# Patient Record
Sex: FEMALE
[De-identification: names, ages, dates, MRNs, and addresses within clinical notes are randomized per-mention and may not be internally consistent; named-entity substitution may affect disease eponyms.]

---

## 2020-02-04 VITALS — SYSTOLIC BLOOD PRESSURE: 105 MMHG | DIASTOLIC BLOOD PRESSURE: 57 MMHG

## 2020-02-04 LAB
BASOPHILS NFR BLD AUTO: 0.5 % (ref 0–5)
EOSINOPHIL NFR BLD AUTO: 0.8 % (ref 0–8)
ERYTHROCYTE [DISTWIDTH] IN BLOOD BY AUTOMATED COUNT: 15.8 % (ref 11–15.5)
HCT VFR BLD AUTO: 35.6 % (ref 36–48)
LYMPHOCYTES NFR SPEC AUTO: 23.3 % (ref 21–51)
MCH RBC QN AUTO: 25.2 PG (ref 27–33)
MCHC RBC AUTO-ENTMCNC: 31.2 G/DL (ref 32–36)
MCV RBC AUTO: 80.7 FL (ref 79–99)
MONOCYTES NFR BLD AUTO: 7.3 % (ref 3–13)
NEUTROPHILS NFR BLD AUTO: 67.8 % (ref 40–77)
NRBC BLD MANUAL-RTO: 0 % (ref 0–0.19)
PLATELET # BLD AUTO: 294 K/UL (ref 130–400)
RBC # BLD AUTO: 4.41 MIL/UL (ref 4–5.5)
WBC # BLD AUTO: 9.6 K/UL (ref 4.8–10.8)

## 2020-02-05 ENCOUNTER — HOSPITAL ENCOUNTER (OUTPATIENT)
Dept: HOSPITAL 90 - DAH | Age: 25
Discharge: HOME | End: 2020-02-05
Attending: SPECIALIST
Payer: MEDICAID

## 2020-02-05 VITALS — DIASTOLIC BLOOD PRESSURE: 76 MMHG | SYSTOLIC BLOOD PRESSURE: 116 MMHG

## 2020-02-05 VITALS — SYSTOLIC BLOOD PRESSURE: 109 MMHG | DIASTOLIC BLOOD PRESSURE: 85 MMHG

## 2020-02-05 VITALS — HEIGHT: 64.5 IN | BODY MASS INDEX: 20.78 KG/M2 | WEIGHT: 123.2 LBS

## 2020-02-05 VITALS — SYSTOLIC BLOOD PRESSURE: 106 MMHG | DIASTOLIC BLOOD PRESSURE: 66 MMHG

## 2020-02-05 VITALS — SYSTOLIC BLOOD PRESSURE: 108 MMHG | DIASTOLIC BLOOD PRESSURE: 81 MMHG

## 2020-02-05 DIAGNOSIS — O03.4: Primary | ICD-10-CM

## 2020-02-05 PROCEDURE — 36415 COLL VENOUS BLD VENIPUNCTURE: CPT

## 2020-02-05 PROCEDURE — 59812 TREATMENT OF MISCARRIAGE: CPT

## 2020-02-05 PROCEDURE — 88305 TISSUE EXAM BY PATHOLOGIST: CPT

## 2020-02-05 PROCEDURE — 85025 COMPLETE CBC W/AUTO DIFF WBC: CPT

## 2020-02-05 NOTE — NUR
DISCHARGE

ORAL AND WRITTEN DISCHARGE INSTRUCTIONS GIVEN TO PT AND PTS BOYFRIEND ALONG WITH 
PRESCRIPTION. NO OTHER QUESTIONS AT THIS TIME.